# Patient Record
Sex: MALE | Race: OTHER | NOT HISPANIC OR LATINO | URBAN - METROPOLITAN AREA
[De-identification: names, ages, dates, MRNs, and addresses within clinical notes are randomized per-mention and may not be internally consistent; named-entity substitution may affect disease eponyms.]

---

## 2024-05-13 ENCOUNTER — EMERGENCY (EMERGENCY)
Facility: HOSPITAL | Age: 34
LOS: 1 days | Discharge: HOME | End: 2024-05-13
Attending: STUDENT IN AN ORGANIZED HEALTH CARE EDUCATION/TRAINING PROGRAM | Admitting: STUDENT IN AN ORGANIZED HEALTH CARE EDUCATION/TRAINING PROGRAM
Payer: COMMERCIAL

## 2024-05-13 VITALS
TEMPERATURE: 98 F | HEIGHT: 73 IN | RESPIRATION RATE: 17 BRPM | SYSTOLIC BLOOD PRESSURE: 122 MMHG | OXYGEN SATURATION: 98 % | DIASTOLIC BLOOD PRESSURE: 74 MMHG | HEART RATE: 60 BPM | WEIGHT: 179.9 LBS

## 2024-05-13 VITALS
HEART RATE: 51 BPM | SYSTOLIC BLOOD PRESSURE: 119 MMHG | RESPIRATION RATE: 17 BRPM | DIASTOLIC BLOOD PRESSURE: 75 MMHG | OXYGEN SATURATION: 100 % | TEMPERATURE: 98 F

## 2024-05-13 DIAGNOSIS — Z90.89 ACQUIRED ABSENCE OF OTHER ORGANS: Chronic | ICD-10-CM

## 2024-05-13 LAB
ANION GAP SERPL CALC-SCNC: 10 MMOL/L — SIGNIFICANT CHANGE UP (ref 5–17)
BASOPHILS # BLD AUTO: 0.01 K/UL — SIGNIFICANT CHANGE UP (ref 0–0.2)
BASOPHILS NFR BLD AUTO: 0.2 % — SIGNIFICANT CHANGE UP (ref 0–2)
BUN SERPL-MCNC: 19 MG/DL — SIGNIFICANT CHANGE UP (ref 7–23)
CALCIUM SERPL-MCNC: 9 MG/DL — SIGNIFICANT CHANGE UP (ref 8.4–10.5)
CHLORIDE SERPL-SCNC: 102 MMOL/L — SIGNIFICANT CHANGE UP (ref 96–108)
CO2 SERPL-SCNC: 27 MMOL/L — SIGNIFICANT CHANGE UP (ref 22–31)
CREAT SERPL-MCNC: 1.16 MG/DL — SIGNIFICANT CHANGE UP (ref 0.5–1.3)
EGFR: 85 ML/MIN/1.73M2 — SIGNIFICANT CHANGE UP
EOSINOPHIL # BLD AUTO: 0.17 K/UL — SIGNIFICANT CHANGE UP (ref 0–0.5)
EOSINOPHIL NFR BLD AUTO: 2.7 % — SIGNIFICANT CHANGE UP (ref 0–6)
GLUCOSE SERPL-MCNC: 92 MG/DL — SIGNIFICANT CHANGE UP (ref 70–99)
HCT VFR BLD CALC: 40.3 % — SIGNIFICANT CHANGE UP (ref 39–50)
HGB BLD-MCNC: 13.6 G/DL — SIGNIFICANT CHANGE UP (ref 13–17)
IMM GRANULOCYTES NFR BLD AUTO: 0.2 % — SIGNIFICANT CHANGE UP (ref 0–0.9)
LYMPHOCYTES # BLD AUTO: 1.98 K/UL — SIGNIFICANT CHANGE UP (ref 1–3.3)
LYMPHOCYTES # BLD AUTO: 31.8 % — SIGNIFICANT CHANGE UP (ref 13–44)
MAGNESIUM SERPL-MCNC: 1.9 MG/DL — SIGNIFICANT CHANGE UP (ref 1.6–2.6)
MCHC RBC-ENTMCNC: 27.9 PG — SIGNIFICANT CHANGE UP (ref 27–34)
MCHC RBC-ENTMCNC: 33.7 GM/DL — SIGNIFICANT CHANGE UP (ref 32–36)
MCV RBC AUTO: 82.8 FL — SIGNIFICANT CHANGE UP (ref 80–100)
MONOCYTES # BLD AUTO: 0.41 K/UL — SIGNIFICANT CHANGE UP (ref 0–0.9)
MONOCYTES NFR BLD AUTO: 6.6 % — SIGNIFICANT CHANGE UP (ref 2–14)
NEUTROPHILS # BLD AUTO: 3.65 K/UL — SIGNIFICANT CHANGE UP (ref 1.8–7.4)
NEUTROPHILS NFR BLD AUTO: 58.5 % — SIGNIFICANT CHANGE UP (ref 43–77)
NRBC # BLD: 0 /100 WBCS — SIGNIFICANT CHANGE UP (ref 0–0)
PLATELET # BLD AUTO: 239 K/UL — SIGNIFICANT CHANGE UP (ref 150–400)
POTASSIUM SERPL-MCNC: 3.5 MMOL/L — SIGNIFICANT CHANGE UP (ref 3.5–5.3)
POTASSIUM SERPL-SCNC: 3.5 MMOL/L — SIGNIFICANT CHANGE UP (ref 3.5–5.3)
RBC # BLD: 4.87 M/UL — SIGNIFICANT CHANGE UP (ref 4.2–5.8)
RBC # FLD: 12.6 % — SIGNIFICANT CHANGE UP (ref 10.3–14.5)
SODIUM SERPL-SCNC: 139 MMOL/L — SIGNIFICANT CHANGE UP (ref 135–145)
TSH SERPL-MCNC: 2.07 UIU/ML — SIGNIFICANT CHANGE UP (ref 0.27–4.2)
WBC # BLD: 6.23 K/UL — SIGNIFICANT CHANGE UP (ref 3.8–10.5)
WBC # FLD AUTO: 6.23 K/UL — SIGNIFICANT CHANGE UP (ref 3.8–10.5)

## 2024-05-13 PROCEDURE — 96374 THER/PROPH/DIAG INJ IV PUSH: CPT

## 2024-05-13 PROCEDURE — 99285 EMERGENCY DEPT VISIT HI MDM: CPT

## 2024-05-13 PROCEDURE — 36415 COLL VENOUS BLD VENIPUNCTURE: CPT

## 2024-05-13 PROCEDURE — 85025 COMPLETE CBC W/AUTO DIFF WBC: CPT

## 2024-05-13 PROCEDURE — 99284 EMERGENCY DEPT VISIT MOD MDM: CPT | Mod: 25

## 2024-05-13 PROCEDURE — 80048 BASIC METABOLIC PNL TOTAL CA: CPT

## 2024-05-13 PROCEDURE — 70450 CT HEAD/BRAIN W/O DYE: CPT | Mod: MC

## 2024-05-13 PROCEDURE — 70450 CT HEAD/BRAIN W/O DYE: CPT | Mod: 26,MC

## 2024-05-13 PROCEDURE — 83735 ASSAY OF MAGNESIUM: CPT

## 2024-05-13 PROCEDURE — 84443 ASSAY THYROID STIM HORMONE: CPT

## 2024-05-13 NOTE — ED ADULT NURSE NOTE - BREATH SOUNDS, RIGHT
clear
76 y/o female admitted with diagnoses of Pneumonia, Acute UTI with coughing and SOB from home, weaned off BIPAP to NC in the ED, BLE found to be cold and Arterial doppler was ordered, had c/o pain (L) knee, x-ray revealed  no acute osseous abnormality

## 2024-05-13 NOTE — ED PROVIDER NOTE - OBJECTIVE STATEMENT
34 yr old male, no medical hx, presents to the Emergency Department w near-syncope. 34 yr old male, no medical hx, presents to the Emergency Department w near-syncope. two weeks ago while at dinner, pt felt lightheaded, nauseated, "panicked" went to walk outside bc he thought he may pass and on his way outside had tunnel vision and then passed out. LOC <1 min, +headstrike. had some shaking movements while unconscious but no tongue bite, incontinence, post ictal period. since that time has had ongoing dull headaches, feeling fatigue. has had few episodes of feeling lightheadedness that resolve on their own and no clear triggers. no symptoms with exertion (plays tennis regularly). saw his pmd this week who recommended cards and neuro follow up. has cards f/u one week from today. today called to make a neuro appt and was told he would have to wait until july so came for evaluation.   no infectious symptoms. no chest pain, sob, palpitations. no vision changes, neck pain, dizziness, extremity weakness / numbness, speech or gait changes.     also had episode 4-5 months ago of similar. syncopal episode while sitting down. 34 yr old male, no medical hx, presents to the Emergency Department w near-syncope. two weeks ago while at dinner, pt felt lightheaded, nauseated, "panicked" went to walk outside bc he thought he may pass and on his way outside had tunnel vision and then passed out. LOC <1 min, +headstrike. had some shaking movements while unconscious but no tongue bite, incontinence, post ictal period. since that time has had ongoing dull headaches, feeling fatigue. has had few episodes of feeling lightheadedness that resolve on their own and no clear triggers. no symptoms with exertion (plays tennis regularly). saw his pmd this week who recommended cards and neuro follow up. has cards f/u one week from today. today called to make a neuro appt and was told he would have to wait until july so came for evaluation.   no infectious symptoms. no chest pain, sob, palpitations. no vision changes, neck pain, dizziness, extremity weakness / numbness, speech or gait changes.   also had episode 4-5 months ago of similar. syncopal episode while sitting down, felt lightheaded and diaphoretic before hand. 34 yr old male, no medical hx, presents to the Emergency Department w near-syncope. two weeks ago while at dinner, pt felt lightheaded, nauseated, "panicked" went to walk outside bc he thought he may pass and on his way outside had tunnel vision and then passed out. LOC <1 min, +headstrike. had some shaking movements while unconscious but no tongue bite, incontinence, post ictal period. since that time has had ongoing dull headaches, feeling fatigue. has had few episodes of feeling lightheadedness that resolve on their own and no clear triggers. no symptoms with exertion (plays tennis regularly). saw his pmd this week who recommended cards and neuro follow up. has cards f/u one week from today. today called to make a neuro appt and was told he would have to wait until july so came for evaluation.   no infectious symptoms. no chest pain, sob, palpitations. no vision changes, neck pain, dizziness, extremity weakness / numbness, speech or gait changes.   also had episode 4-5 months ago of similar. syncopal episode while sitting down, felt lightheaded and diaphoretic before hand.  no fhx cad or sudden cardiac death. no hx dvt/pe, recent travel / prolonged immobilization.

## 2024-05-13 NOTE — ED ADULT TRIAGE NOTE - CHIEF COMPLAINT QUOTE
syncope episode 2 weeks ago after feeling like he was having a panic attack. Since, he has been feeling "out of it" generally weak and tired. Denies cards hx, hx of vasovagal, dizziness. Was advised by his pcp to f/u with neuro and cards, he could not find an appt for neuro, so came here. No focal deficits, denies cp, SOB, dizziness, gait changes. syncope episode 2 weeks ago after feeling like he was having a panic attack. Since, he has been feeling "out of it" generally weak and tired. Denies cards hx, hx of vasovagal, dizziness. Was advised by his pcp to f/u with neuro and cards, he could not find an appt for neuro, so came here. No focal deficits, denies cp, SOB, dizziness, gait changes. ekg done, HR noticed to be between 46-64 bpm in triage

## 2024-05-13 NOTE — ED PROVIDER NOTE - NSFOLLOWUPINSTRUCTIONS_ED_ALL_ED_FT
SYNCOPE - "Syncope," also referred to as fainting, is the sudden loss of consciousness, followed by rapid / complete recovery. In coming to the ED we have evaluated you for this condition and have determined that no other emergency testing is indicated in the ER today to rule out other life threatening causes of your symptoms, however you will require further evaluation after leaving the Emergency Department today. The remaining workup will be done as an outpatient given we don't have the capacity to perform every test in the ER.    GENERAL INSTRUCTIONS - Drink plenty of fluids. Get plenty of rest. Avoid strenuous activity until you follow up outpatient.    FOLLOW UP - Follow up with CARDIOLOGY next week as previously scheduled.     RETURN PRECAUTIONS - Return to this emergency department if you pass out or lose consciousness again / repeatedly, feel palpitations / chest pain / shortness of breath, have nausea vomiting diarrhea, any signs of bleeding anywhere (spontaneous bleeding, bloody/black stools), or if you have any other concerns.

## 2024-05-13 NOTE — ED ADULT NURSE NOTE - OBJECTIVE STATEMENT
Pt A&Ox4 and able to speak in complete sentences. pt arrived d/t weakness, tiredness after syncopal episode two weeks ago. Pt endorsed during syncopal episode he had +LOC for unknown length of time but since has been endorsing tiredness and intermittent headaches. Pt denies numbness, tingling, blurry vision, headache, n, v, cp, sob, fevers, chills, changes in gait at this time. Pt attached to cardiac monitor. Pt bradycardic between 47-60s.

## 2024-05-13 NOTE — ED PROVIDER NOTE - PROGRESS NOTE DETAILS
labs unremarkable. ct head w/o acute findings  observed on cardiac monitor and HR maintained high 50s/ low 60s. pt asymptomatic.   offered admission for syncope eval / symptomatic bradycardia but pt labs unremarkable. ct head w/o acute findings  observed on cardiac monitor and HR maintained high 50s/ low 60s. pt asymptomatic.   offered admission for syncope eval / symptomatic bradycardia but pt declined. is comfortable waiting for cards appt next week, was primarily concerned about getting his head evaluated.   HR 60s, vitals otherwise ok, ok for dc and outpt follow up.    All results reviewed with the patient verbally. Discharge plan and return precautions d/w pt who verbalized understanding and agrees with plan. All questions answered. Vitals WNL. Ready for d/c.

## 2024-05-13 NOTE — ED PROVIDER NOTE - CLINICAL SUMMARY MEDICAL DECISION MAKING FREE TEXT BOX
s//p syncopal episode two weeks ago, pt felt lightheaded, nauseated, "panicked" went to walk outside bc he thought he may pass and on his way outside had tunnel vision and then passed out. LOC <1 min, +headstrike. had some shaking movements while unconscious but no tongue bite, incontinence, post ictal period. since that time has had ongoing dull headaches, feeling fatigue. has had few episodes of feeling lightheadedness that resolve on their own and no clear triggers. no symptoms with exertion (plays tennis regularly). saw his pmd this week who recommended cards and neuro follow up. has cards f/u one week from today. today called to make a neuro appt and was told he would have to wait until july so came for evaluation.   pt well appearing, HR 60 but vitals otherwise ok, exam unremarkable - neuro intact  denies clear history of bradycardia but does exercise regularly. ?symptomatic bradycardia vs vasovagal episodes vs orthostatic hypotension.  assess for anemia, electrolyte derangement, dehydration, thyroid dysfunction.  pt concerned about potential head injury - low suspicion ich given two weeks out and reassuring exam but will get ct head.   plan - labs, ecg, ct head  observe on cardiac monitor  can consider admission for symptomatic bradycardia / syncope workup on initial discussion pt hesitant. s//p syncopal episode two weeks ago, pt felt lightheaded, nauseated, "panicked" went to walk outside bc he thought he may pass and on his way outside had tunnel vision and then passed out. LOC <1 min, +headstrike. had some shaking movements while unconscious but no tongue bite, incontinence, post ictal period. since that time has had ongoing dull headaches, feeling fatigue. has had few episodes of feeling lightheadedness that resolve on their own and no clear triggers. no symptoms with exertion (plays tennis regularly). saw his pmd this week who recommended cards and neuro follow up. has cards f/u one week from today. today called to make a neuro appt and was told he would have to wait until july so came for evaluation.   pt well appearing, HR 60 but vitals otherwise ok, exam unremarkable - neuro intact  denies clear history of bradycardia but does exercise regularly. ?symptomatic bradycardia vs vasovagal episodes vs orthostatic hypotension.  assess for anemia, electrolyte derangement, dehydration, thyroid dysfunction.  no cp or sob and no risk factors for pe.   pt concerned about potential head injury - low suspicion ich given two weeks out and reassuring exam but will get ct head.   plan - labs, ecg, ct head  observe on cardiac monitor  can consider admission for symptomatic bradycardia / syncope workup on initial discussion pt hesitant.

## 2024-05-13 NOTE — ED ADULT NURSE NOTE - CHIEF COMPLAINT QUOTE
syncope episode 2 weeks ago after feeling like he was having a panic attack. Since, he has been feeling "out of it" generally weak and tired. Denies cards hx, hx of vasovagal, dizziness. Was advised by his pcp to f/u with neuro and cards, he could not find an appt for neuro, so came here. No focal deficits, denies cp, SOB, dizziness, gait changes. ekg done, HR noticed to be between 46-64 bpm in triage

## 2024-05-13 NOTE — ED PROVIDER NOTE - PATIENT PORTAL LINK FT
You can access the FollowMyHealth Patient Portal offered by [facility] by registering at the following website: [email or web address]. By joining [facility]’s FollowMyHealth portal, you will also be able to view your health information using other applications (apps) compatible with our system.

## 2024-05-13 NOTE — ED PROVIDER NOTE - PHYSICAL EXAMINATION
Constitutional : Well appearing, non-toxic, no acute distress. awake, alert, oriented to person, place, time/situation.  Head : head normocephalic, atraumatic  EENMT : eyes clear bilaterally, PERRL, EOMI. airway patent. moist mucous membranes. neck supple.  Cardiac : Normal rate, regular rhythm. No murmur appreciated, no LE edema.  Resp : Breath sounds clear and equal bilaterally. Respirations even and unlabored.   Gastro : abdomen soft, nontender, nondistended. no rebound or guarding.   MSK :  range of motion is not limited, no muscle or joint tenderness  Back : No evidence of trauma. No spinal tenderness.   Vasc : Extremities warm and well perfused. 2+ radial and DP pulses. cap refill <2 seconds  Neuro : A&Ox3, CNII-XII grossly intact. visual fields intact, no nystagmus, normal speech and word-finding. 5/5 motor in UE and LE, sensation intact throughout. ambulating with a steady gait, normal tandem gait. normal finger to nose. negative Romberg, no pronator drift   Skin : Skin normal color for race, warm, dry and intact. No evidence of rash.  Psych : Alert and oriented to person, place, time/situation. normal mood and affect. no apparent risk to self or others.

## 2024-05-13 NOTE — ED ADULT NURSE NOTE - NSFALLRISKINTERV_ED_ALL_ED

## 2024-05-17 DIAGNOSIS — R11.0 NAUSEA: ICD-10-CM

## 2024-05-17 DIAGNOSIS — R55 SYNCOPE AND COLLAPSE: ICD-10-CM

## 2024-05-17 DIAGNOSIS — R00.1 BRADYCARDIA, UNSPECIFIED: ICD-10-CM

## 2024-05-17 DIAGNOSIS — R42 DIZZINESS AND GIDDINESS: ICD-10-CM

## 2025-01-22 NOTE — ED ADULT NURSE NOTE - URINE CHARACTERISTICS
Lyrica was sent in yesterday by pain management   Patient states myrbetriq is expensive for him and he is wondering if he should continue or if there is an alt  Please advise.    clear